# Patient Record
Sex: MALE | Race: WHITE | HISPANIC OR LATINO | URBAN - METROPOLITAN AREA
[De-identification: names, ages, dates, MRNs, and addresses within clinical notes are randomized per-mention and may not be internally consistent; named-entity substitution may affect disease eponyms.]

---

## 2020-06-29 NOTE — PATIENT DISCUSSION
REFRACTIVE ERROR OU - GLASSES RX GIVEN TO FILL IF PATIENT DESIRES. HE IS NOTICING SOME DECREASE IN DISTANCE VISION AND THESE GLASSES WILL GET HIM TO 20/20.

## 2020-06-29 NOTE — PATIENT DISCUSSION
Flomax (Tamsulosin) and Uroxatrol (Alfuzosin) Use: These medications are usually prescribed for benign prostatic hypertrophy in men, urologists also prescribe it to women for urinary retention . In some cases it may be necessary to discontinue the use of Flomax (Tamsulosin) and Uroxatrol (Alfuzosin) 7 days prior to surgery. It is the patient's responsibility to consult with his/her prescribing physician for possible risks or side effects of stopping this medication and gain his/her permission prior to stopping. The patient may resume taking this medication immediately following cataract surgery unless otherwise directed by the surgeon. The use of these medications has been associated with higher risk of complications at the time of cataract surgery. These complications include, but are not limited to, poor dilation of the iris, floppy iris, iris atrophy or trauma related to the procedure, capsular tears, virtreous prolapse, lens fragments dislocating to the back of the eye, retinal detachment, decreased vision and even, in rare situations, loss of the eye.

## 2020-10-19 NOTE — PATIENT DISCUSSION
THYGESONS KERATITIS OS:  DISCONTINUE ZIRGAN AND WILL PRESCRIBE PRED FORTE OS QID X 1 WEEK. WILL FOLLOW UP IN 1 WEEK.

## 2020-10-19 NOTE — PATIENT DISCUSSION
New Prescription: Pred Forte (prednisolone acetate): drops,suspension: 1% 1 drop four times a day into left eye 10-

## 2020-12-10 NOTE — PATIENT DISCUSSION
HISTORY OF THYGESON'S OS BUT NOW HAVING IRRITATION OD SO WILLL START PRED FORTE OD QID X 1 WEEK. ALSO CAN USE ARTIFICIAL TEARS OD QID.

## 2021-01-22 NOTE — PATIENT DISCUSSION
New Prescription: moxifloxacin (moxifloxacin): drops: 0.5% 1 drop four times a day into left eye 01-

## 2021-01-22 NOTE — PATIENT DISCUSSION
HISTORY OF THYGESON'S OS BUT NOW HAVING IRRITATION OD SO WILLL START VIGAMOX OD QID X 1 WEEK. ALSO CAN USE ARTIFICIAL TEARS OD QID.

## 2021-01-29 NOTE — PATIENT DISCUSSION
HISTORY OF THYGESON'S OS BUT NOW IRRITATION OD SO WILLL STOP VIGAMOX OD AND START PRED FORTE OD QID. WILL NOT SEND TO PHARMACY DUE TO PATIENT HAVING IT AT HOME FROM [place]. ALSO CAN USE ARTIFICIAL TEARS OD QID.

## 2022-07-12 ENCOUNTER — ESTABLISHED PATIENT (OUTPATIENT)
Dept: URBAN - METROPOLITAN AREA CLINIC 20 | Facility: CLINIC | Age: 87
End: 2022-07-12

## 2022-07-12 DIAGNOSIS — H35.363: ICD-10-CM

## 2022-07-12 DIAGNOSIS — E11.9: ICD-10-CM

## 2022-07-12 PROCEDURE — 92134 CPTRZ OPH DX IMG PST SGM RTA: CPT

## 2022-07-12 PROCEDURE — 92014 COMPRE OPH EXAM EST PT 1/>: CPT

## 2022-07-12 ASSESSMENT — TONOMETRY
OD_IOP_MMHG: 11
OS_IOP_MMHG: 10

## 2022-07-12 ASSESSMENT — VISUAL ACUITY
OS_SC: 20/20
OD_SC: 20/20-2

## 2022-10-18 ENCOUNTER — ESTABLISHED PATIENT (OUTPATIENT)
Dept: URBAN - METROPOLITAN AREA CLINIC 20 | Facility: CLINIC | Age: 87
End: 2022-10-18

## 2022-10-18 DIAGNOSIS — E11.9: ICD-10-CM

## 2022-10-18 DIAGNOSIS — H35.363: ICD-10-CM

## 2022-10-18 DIAGNOSIS — H49.23: ICD-10-CM

## 2022-10-18 DIAGNOSIS — H53.2: ICD-10-CM

## 2022-10-18 PROCEDURE — 99213 OFFICE O/P EST LOW 20 MIN: CPT

## 2022-10-18 ASSESSMENT — VISUAL ACUITY
OD_SC: 20/20-1
OS_SC: 20/20-1

## 2022-10-18 ASSESSMENT — TONOMETRY
OS_IOP_MMHG: 13
OD_IOP_MMHG: 10

## 2023-12-14 ENCOUNTER — ESTABLISHED PATIENT (OUTPATIENT)
Dept: URBAN - METROPOLITAN AREA CLINIC 20 | Facility: CLINIC | Age: 88
End: 2023-12-14

## 2023-12-14 DIAGNOSIS — H49.23: ICD-10-CM

## 2023-12-14 PROCEDURE — 99214 OFFICE O/P EST MOD 30 MIN: CPT

## 2023-12-14 ASSESSMENT — TONOMETRY
OD_IOP_MMHG: 09
OS_IOP_MMHG: 09

## 2023-12-14 ASSESSMENT — VISUAL ACUITY
OS_SC: 20/25+1
OD_SC: 20/20-2

## 2024-12-16 ENCOUNTER — COMPREHENSIVE EXAM (OUTPATIENT)
Age: 89
End: 2024-12-16

## 2024-12-16 DIAGNOSIS — H35.363: ICD-10-CM

## 2024-12-16 DIAGNOSIS — E11.9: ICD-10-CM

## 2024-12-16 PROCEDURE — 92014 COMPRE OPH EXAM EST PT 1/>: CPT
